# Patient Record
Sex: MALE | Race: WHITE
[De-identification: names, ages, dates, MRNs, and addresses within clinical notes are randomized per-mention and may not be internally consistent; named-entity substitution may affect disease eponyms.]

---

## 2021-09-10 ENCOUNTER — HOSPITAL ENCOUNTER (EMERGENCY)
Dept: HOSPITAL 8 - ED | Age: 31
Discharge: HOME | End: 2021-09-10
Payer: COMMERCIAL

## 2021-09-10 VITALS — SYSTOLIC BLOOD PRESSURE: 105 MMHG | DIASTOLIC BLOOD PRESSURE: 66 MMHG

## 2021-09-10 VITALS — HEIGHT: 69 IN | WEIGHT: 141.1 LBS | BODY MASS INDEX: 20.9 KG/M2
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
## 2021-09-10 DIAGNOSIS — S52.501A: Primary | ICD-10-CM
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
## 2021-09-10 DIAGNOSIS — S52.021A: ICD-10-CM
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
## 2021-09-10 DIAGNOSIS — F17.210: ICD-10-CM
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
## 2021-09-10 PROCEDURE — 99152 MOD SED SAME PHYS/QHP 5/>YRS: CPT

## 2021-09-10 PROCEDURE — 73110 X-RAY EXAM OF WRIST: CPT

## 2021-09-10 PROCEDURE — 96361 HYDRATE IV INFUSION ADD-ON: CPT

## 2021-09-10 PROCEDURE — 73100 X-RAY EXAM OF WRIST: CPT

## 2021-09-10 PROCEDURE — 96374 THER/PROPH/DIAG INJ IV PUSH: CPT

## 2021-09-10 PROCEDURE — 96376 TX/PRO/DX INJ SAME DRUG ADON: CPT

## 2021-09-10 PROCEDURE — 99285 EMERGENCY DEPT VISIT HI MDM: CPT

## 2021-09-10 PROCEDURE — 99406 BEHAV CHNG SMOKING 3-10 MIN: CPT

## 2021-09-10 PROCEDURE — 73080 X-RAY EXAM OF ELBOW: CPT

## 2021-09-10 PROCEDURE — 96375 TX/PRO/DX INJ NEW DRUG ADDON: CPT

## 2021-09-10 PROCEDURE — 25605 CLTX DST RDL FX/EPHYS SEP W/: CPT

## 2021-09-10 PROCEDURE — 99153 MOD SED SAME PHYS/QHP EA: CPT

## 2021-09-10 NOTE — NUR
Patient given discharge instructions and they have confirmed that they 
understand the instructions.  Patient ambulatory with steady gait. DC'd w/ 
girlfriend.

## 2021-09-10 NOTE — NUR
PIV PLACED, MEDS ADMIN AS PER MAR. ERNP NOTIFIED OF LOW BP, ORDER RECEIVED FOR 
IVF BOLUS. IVF RUNNING AS PER MAR. /65.